# Patient Record
Sex: MALE | Race: WHITE | NOT HISPANIC OR LATINO | URBAN - METROPOLITAN AREA
[De-identification: names, ages, dates, MRNs, and addresses within clinical notes are randomized per-mention and may not be internally consistent; named-entity substitution may affect disease eponyms.]

---

## 2018-07-09 ENCOUNTER — OFFICE VISIT (OUTPATIENT)
Dept: URBAN - METROPOLITAN AREA CLINIC 16 | Facility: CLINIC | Age: 73
End: 2018-07-09
Payer: COMMERCIAL

## 2018-07-09 DIAGNOSIS — H52.03 HYPERMETROPIA, BILATERAL: ICD-10-CM

## 2018-07-09 DIAGNOSIS — H25.13 AGE-RELATED NUCLEAR CATARACT, BILATERAL: ICD-10-CM

## 2018-07-09 PROCEDURE — 92014 COMPRE OPH EXAM EST PT 1/>: CPT | Performed by: OPTOMETRIST

## 2018-07-09 ASSESSMENT — KERATOMETRY
OS: 43.25
OD: 43.38

## 2018-07-09 ASSESSMENT — VISUAL ACUITY
OD: 20/20
OS: 20/20

## 2018-07-09 ASSESSMENT — INTRAOCULAR PRESSURE
OS: 12
OD: 11

## 2018-07-09 NOTE — IMPRESSION/PLAN
Impression: Type 2 diabetes mellitus without complications: T56.0. Plan: Diabetes type II: no background retinopathy, no signs of neovascularization noted. Discussed ocular and systemic benefits of blood sugar control.

## 2020-05-11 ENCOUNTER — OFFICE VISIT (OUTPATIENT)
Dept: URBAN - METROPOLITAN AREA CLINIC 16 | Facility: CLINIC | Age: 75
End: 2020-05-11
Payer: COMMERCIAL

## 2020-05-11 PROCEDURE — 92014 COMPRE OPH EXAM EST PT 1/>: CPT | Performed by: OPTOMETRIST

## 2020-05-11 ASSESSMENT — VISUAL ACUITY
OD: 20/20
OS: 20/20

## 2020-05-11 ASSESSMENT — INTRAOCULAR PRESSURE
OS: 9
OD: 9

## 2020-05-11 NOTE — IMPRESSION/PLAN
Impression: Type 2 diabetes mellitus without complications: V88.7. Plan: Diabetes type II: no background retinopathy, no signs of neovascularization or macular edema noted. Discussed ocular and systemic benefits of blood sugar control.

## 2021-08-26 ENCOUNTER — OFFICE VISIT (OUTPATIENT)
Dept: URBAN - METROPOLITAN AREA CLINIC 16 | Facility: CLINIC | Age: 76
End: 2021-08-26
Payer: COMMERCIAL

## 2021-08-26 PROCEDURE — 99214 OFFICE O/P EST MOD 30 MIN: CPT | Performed by: OPTOMETRIST

## 2021-08-26 ASSESSMENT — KERATOMETRY
OS: 43.25
OD: 43.38

## 2021-08-26 ASSESSMENT — INTRAOCULAR PRESSURE
OS: 10
OD: 9

## 2021-08-26 ASSESSMENT — VISUAL ACUITY
OS: 20/30
OD: 20/30

## 2021-08-26 NOTE — IMPRESSION/PLAN
Impression: Age-related nuclear cataract, bilateral: H25.13.  Plan: Patient interested in cataract surgery

## 2021-08-26 NOTE — IMPRESSION/PLAN
Impression: Type 2 diabetes mellitus without complications: K13.2. Plan: Diabetes type II: no background retinopathy, no signs of neovascularization or macular edema noted. Discussed ocular and systemic benefits of blood sugar control.

## 2021-08-26 NOTE — IMPRESSION/PLAN
Impression: Post-traumatic hydrocephalus, unspecified: G91.3.  Noting dimness OS Plan: RTC for VF (30-2)

## 2021-09-13 ENCOUNTER — TESTING ONLY (OUTPATIENT)
Dept: URBAN - METROPOLITAN AREA CLINIC 16 | Facility: CLINIC | Age: 76
End: 2021-09-13
Payer: COMMERCIAL

## 2021-09-13 DIAGNOSIS — G91.3 POST-TRAUMATIC HYDROCEPHALUS, UNSPECIFIED: Primary | ICD-10-CM

## 2021-09-13 PROCEDURE — 92083 EXTENDED VISUAL FIELD XM: CPT | Performed by: OPTOMETRIST

## 2021-09-17 ENCOUNTER — OFFICE VISIT (OUTPATIENT)
Dept: URBAN - METROPOLITAN AREA CLINIC 16 | Facility: CLINIC | Age: 76
End: 2021-09-17
Payer: COMMERCIAL

## 2021-09-17 DIAGNOSIS — E11.9 TYPE 2 DIABETES MELLITUS WITHOUT COMPLICATIONS: ICD-10-CM

## 2021-09-17 DIAGNOSIS — H25.813 COMBINED FORMS OF AGE-RELATED CATARACT, BILATERAL: Primary | ICD-10-CM

## 2021-09-17 PROCEDURE — 99204 OFFICE O/P NEW MOD 45 MIN: CPT | Performed by: OPHTHALMOLOGY

## 2021-09-17 ASSESSMENT — VISUAL ACUITY
OS: 20/25
OD: 20/25

## 2021-09-17 ASSESSMENT — INTRAOCULAR PRESSURE
OD: 11
OS: 11

## 2021-09-17 ASSESSMENT — KERATOMETRY
OD: 43.50
OS: 43.25

## 2021-09-17 NOTE — IMPRESSION/PLAN
Impression: Nonexudative macular degeneration, early dry stage, bilateral: H35.6182. Condition: new prob, no addtl w/u needed. Plan: Macular degeneration, dry type with stable vision. Will continue to monitor vision and the patient has been instructed to call with any vision changes.

## 2021-09-17 NOTE — IMPRESSION/PLAN
Impression: Type 2 diabetes mellitus without complications: S79.7. Condition: new prob, no addtl w/u needed. Plan: Diabetes type II: no background retinopathy, no signs of neovascularization noted. Discussed ocular and systemic benefits of blood sugar control.

## 2022-03-01 ENCOUNTER — OFFICE VISIT (OUTPATIENT)
Dept: URBAN - METROPOLITAN AREA CLINIC 16 | Facility: CLINIC | Age: 77
End: 2022-03-01
Payer: COMMERCIAL

## 2022-03-01 DIAGNOSIS — H35.3131 NONEXUDATIVE MACULAR DEGENERATION, EARLY DRY STAGE, BILATERAL: ICD-10-CM

## 2022-03-01 DIAGNOSIS — H52.4 PRESBYOPIA: ICD-10-CM

## 2022-03-01 PROCEDURE — 92014 COMPRE OPH EXAM EST PT 1/>: CPT | Performed by: OPTOMETRIST

## 2022-03-01 ASSESSMENT — INTRAOCULAR PRESSURE
OS: 13
OD: 12

## 2022-03-01 ASSESSMENT — VISUAL ACUITY
OS: 20/25
OD: 20/25

## 2022-03-01 ASSESSMENT — KERATOMETRY
OS: 43.38
OD: 43.50

## 2022-03-01 NOTE — IMPRESSION/PLAN
Impression: Nonexudative macular degeneration, early dry stage, bilateral: H35.4941. Plan: Pt ed re: condition, advised to use [employer] grid for home monitoring of vision changes. Optos photos performed and reviewed w/pt. Condition not advanced enough for AREDS vitamins at this time, consider if progression in the future.   
RTC 1 year x dilated exam.

## 2022-03-01 NOTE — IMPRESSION/PLAN
Impression: Type 2 diabetes mellitus without complications: Q60.2. Plan: No Non-Proliferative Diabetic Retinopathy, no Diabetic Macular Edema and no Neovascularization of the iris, disc, or elsewhere. OPTOS photos performed and reviewed today, no sign of diabetic macular edema OU. Discussed ocular and systemic benefits of blood sugar control. Send notes to PCP. Check annually. RTC 1 year x CEE.